# Patient Record
Sex: MALE | Race: WHITE | HISPANIC OR LATINO | Employment: FULL TIME | URBAN - METROPOLITAN AREA
[De-identification: names, ages, dates, MRNs, and addresses within clinical notes are randomized per-mention and may not be internally consistent; named-entity substitution may affect disease eponyms.]

---

## 2017-08-15 ENCOUNTER — HOSPITAL ENCOUNTER (EMERGENCY)
Facility: MEDICAL CENTER | Age: 42
End: 2017-08-15
Attending: EMERGENCY MEDICINE
Payer: COMMERCIAL

## 2017-08-15 VITALS
OXYGEN SATURATION: 95 % | DIASTOLIC BLOOD PRESSURE: 111 MMHG | TEMPERATURE: 98.2 F | HEART RATE: 83 BPM | HEIGHT: 67 IN | BODY MASS INDEX: 44.15 KG/M2 | SYSTOLIC BLOOD PRESSURE: 207 MMHG | WEIGHT: 281.31 LBS | RESPIRATION RATE: 20 BRPM

## 2017-08-15 DIAGNOSIS — I10 ESSENTIAL HYPERTENSION: ICD-10-CM

## 2017-08-15 LAB
ALBUMIN SERPL BCP-MCNC: 4 G/DL (ref 3.2–4.9)
ALBUMIN/GLOB SERPL: 1.2 G/DL
ALP SERPL-CCNC: 86 U/L (ref 30–99)
ALT SERPL-CCNC: 38 U/L (ref 2–50)
ANION GAP SERPL CALC-SCNC: 10 MMOL/L (ref 0–11.9)
AST SERPL-CCNC: 28 U/L (ref 12–45)
BASOPHILS # BLD AUTO: 0.5 % (ref 0–1.8)
BASOPHILS # BLD: 0.04 K/UL (ref 0–0.12)
BILIRUB SERPL-MCNC: 0.6 MG/DL (ref 0.1–1.5)
BUN SERPL-MCNC: 14 MG/DL (ref 8–22)
CALCIUM SERPL-MCNC: 9 MG/DL (ref 8.5–10.5)
CHLORIDE SERPL-SCNC: 105 MMOL/L (ref 96–112)
CO2 SERPL-SCNC: 25 MMOL/L (ref 20–33)
CREAT SERPL-MCNC: 0.89 MG/DL (ref 0.5–1.4)
EKG IMPRESSION: NORMAL
EOSINOPHIL # BLD AUTO: 0.11 K/UL (ref 0–0.51)
EOSINOPHIL NFR BLD: 1.4 % (ref 0–6.9)
ERYTHROCYTE [DISTWIDTH] IN BLOOD BY AUTOMATED COUNT: 42.2 FL (ref 35.9–50)
GFR SERPL CREATININE-BSD FRML MDRD: >60 ML/MIN/1.73 M 2
GLOBULIN SER CALC-MCNC: 3.3 G/DL (ref 1.9–3.5)
GLUCOSE SERPL-MCNC: 134 MG/DL (ref 65–99)
HCT VFR BLD AUTO: 43.3 % (ref 42–52)
HGB BLD-MCNC: 15.4 G/DL (ref 14–18)
IMM GRANULOCYTES # BLD AUTO: 0.02 K/UL (ref 0–0.11)
IMM GRANULOCYTES NFR BLD AUTO: 0.3 % (ref 0–0.9)
LYMPHOCYTES # BLD AUTO: 2.45 K/UL (ref 1–4.8)
LYMPHOCYTES NFR BLD: 31.4 % (ref 22–41)
MCH RBC QN AUTO: 29.8 PG (ref 27–33)
MCHC RBC AUTO-ENTMCNC: 35.6 G/DL (ref 33.7–35.3)
MCV RBC AUTO: 83.9 FL (ref 81.4–97.8)
MONOCYTES # BLD AUTO: 0.46 K/UL (ref 0–0.85)
MONOCYTES NFR BLD AUTO: 5.9 % (ref 0–13.4)
NEUTROPHILS # BLD AUTO: 4.73 K/UL (ref 1.82–7.42)
NEUTROPHILS NFR BLD: 60.5 % (ref 44–72)
NRBC # BLD AUTO: 0 K/UL
NRBC BLD AUTO-RTO: 0 /100 WBC
PLATELET # BLD AUTO: 263 K/UL (ref 164–446)
PMV BLD AUTO: 10.2 FL (ref 9–12.9)
POTASSIUM SERPL-SCNC: 3.6 MMOL/L (ref 3.6–5.5)
PROT SERPL-MCNC: 7.3 G/DL (ref 6–8.2)
RBC # BLD AUTO: 5.16 M/UL (ref 4.7–6.1)
SODIUM SERPL-SCNC: 140 MMOL/L (ref 135–145)
TROPONIN I SERPL-MCNC: 0.01 NG/ML (ref 0–0.04)
WBC # BLD AUTO: 7.8 K/UL (ref 4.8–10.8)

## 2017-08-15 PROCEDURE — 99284 EMERGENCY DEPT VISIT MOD MDM: CPT

## 2017-08-15 PROCEDURE — 93005 ELECTROCARDIOGRAM TRACING: CPT

## 2017-08-15 PROCEDURE — 85025 COMPLETE CBC W/AUTO DIFF WBC: CPT

## 2017-08-15 PROCEDURE — 84484 ASSAY OF TROPONIN QUANT: CPT

## 2017-08-15 PROCEDURE — 80053 COMPREHEN METABOLIC PANEL: CPT

## 2017-08-15 RX ORDER — LISINOPRIL 20 MG/1
20 TABLET ORAL DAILY
Qty: 30 TAB | Refills: 1 | Status: SHIPPED | OUTPATIENT
Start: 2017-08-15

## 2017-08-15 RX ORDER — LISINOPRIL 10 MG/1
10 TABLET ORAL DAILY
COMMUNITY
End: 2017-08-15

## 2017-08-15 RX ORDER — ATORVASTATIN CALCIUM 10 MG/1
10 TABLET, FILM COATED ORAL NIGHTLY
COMMUNITY

## 2017-08-15 ASSESSMENT — LIFESTYLE VARIABLES
AVERAGE NUMBER OF DAYS PER WEEK YOU HAVE A DRINK CONTAINING ALCOHOL: 1
HOW MANY TIMES IN THE PAST YEAR HAVE YOU HAD 5 OR MORE DRINKS IN A DAY: 0
EVER FELT BAD OR GUILTY ABOUT YOUR DRINKING: NO
TOTAL SCORE: 0
HAVE YOU EVER FELT YOU SHOULD CUT DOWN ON YOUR DRINKING: NO
TOTAL SCORE: 0
ON A TYPICAL DAY WHEN YOU DRINK ALCOHOL HOW MANY DRINKS DO YOU HAVE: 1
EVER HAD A DRINK FIRST THING IN THE MORNING TO STEADY YOUR NERVES TO GET RID OF A HANGOVER: NO
TOTAL SCORE: 0
CONSUMPTION TOTAL: NEGATIVE
DO YOU DRINK ALCOHOL: YES
HAVE PEOPLE ANNOYED YOU BY CRITICIZING YOUR DRINKING: NO

## 2017-08-15 ASSESSMENT — PAIN SCALES - GENERAL: PAINLEVEL_OUTOF10: 0

## 2017-08-15 NOTE — ED NOTES
Pt came in on recommendation from PCP due to HTN.  Pt presents in ER with blood pressure 190/112.  Pt states history of DM2.  States does not know blood glucose value and does not normally measure BG.

## 2017-08-15 NOTE — DISCHARGE INSTRUCTIONS
Arterial Hypertension  Arterial hypertension (high blood pressure) is a condition of elevated pressure in your blood vessels. Hypertension over a long period of time is a risk factor for strokes, heart attacks, and heart failure. It is also the leading cause of kidney (renal) failure.   CAUSES   · In Adults -- Over 90% of all hypertension has no known cause. This is called essential or primary hypertension. In the other 10% of people with hypertension, the increase in blood pressure is caused by another disorder. This is called secondary hypertension. Important causes of secondary hypertension are:  · Heavy alcohol use.  · Obstructive sleep apnea.  · Hyperaldosterosim (Conn's syndrome).  · Steroid use.  · Chronic kidney failure.  · Hyperparathyroidism.  · Medications.  · Renal artery stenosis.  · Pheochromocytoma.  · Cushing's disease.  · Coarctation of the aorta.  · Scleroderma renal crisis.  · Licorice (in excessive amounts).  · Drugs (cocaine, methamphetamine).  Your caregiver can explain any items above that apply to you.  · In Children -- Secondary hypertension is more common and should always be considered.  · Pregnancy -- Few women of childbearing age have high blood pressure. However, up to 10% of them develop hypertension of pregnancy. Generally, this will not harm the woman. It may be a sign of 3 complications of pregnancy: preeclampsia, HELLP syndrome, and eclampsia. Follow up and control with medication is necessary.  SYMPTOMS   · This condition normally does not produce any noticeable symptoms. It is usually found during a routine exam.  · Malignant hypertension is a late problem of high blood pressure. It may have the following symptoms:  · Headaches.  · Blurred vision.  · End-organ damage (this means your kidneys, heart, lungs, and other organs are being damaged).  · Stressful situations can increase the blood pressure. If a person with normal blood pressure has their blood pressure go up while being  "seen by their caregiver, this is often termed \"white coat hypertension.\" Its importance is not known. It may be related with eventually developing hypertension or complications of hypertension.  · Hypertension is often confused with mental tension, stress, and anxiety.  DIAGNOSIS   The diagnosis is made by 3 separate blood pressure measurements. They are taken at least 1 week apart from each other. If there is organ damage from hypertension, the diagnosis may be made without repeat measurements.  Hypertension is usually identified by having blood pressure readings:  · Above 140/90 mmHg measured in both arms, at 3 separate times, over a couple weeks.  · Over 130/80 mmHg should be considered a risk factor and may require treatment in patients with diabetes.  Blood pressure readings over 120/80 mmHg are called \"pre-hypertension\" even in non-diabetic patients.  To get a true blood pressure measurement, use the following guidelines. Be aware of the factors that can alter blood pressure readings.  · Take measurements at least 1 hour after caffeine.  · Take measurements 30 minutes after smoking and without any stress. This is another reason to quit smoking  it raises your blood pressure.  · Use a proper cuff size. Ask your caregiver if you are not sure about your cuff size.  · Most home blood pressure cuffs are automatic. They will measure systolic and diastolic pressures. The systolic pressure is the pressure reading at the start of sounds. Diastolic pressure is the pressure at which the sounds disappear. If you are elderly, measure pressures in multiple postures. Try sitting, lying or standing.  · Sit at rest for a minimum of 5 minutes before taking measurements.  · You should not be on any medications like decongestants. These are found in many cold medications.  · Record your blood pressure readings and review them with your caregiver.  If you have hypertension:  · Your caregiver may do tests to be sure you do not have " "secondary hypertension (see \"causes\" above).  · Your caregiver may also look for signs of metabolic syndrome. This is also called Syndrome X or Insulin Resistance Syndrome. You may have this syndrome if you have type 2 diabetes, abdominal obesity, and abnormal blood lipids in addition to hypertension.  · Your caregiver will take your medical and family history and perform a physical exam.  · Diagnostic tests may include blood tests (for glucose, cholesterol, potassium, and kidney function), a urinalysis, or an EKG. Other tests may also be necessary depending on your condition.  PREVENTION   There are important lifestyle issues that you can adopt to reduce your chance of developing hypertension:  · Maintain a normal weight.  · Limit the amount of salt (sodium) in your diet.  · Exercise often.  · Limit alcohol intake.  · Get enough potassium in your diet. Discuss specific advice with your caregiver.  · Follow a DASH diet (dietary approaches to stop hypertension). This diet is rich in fruits, vegetables, and low-fat dairy products, and avoids certain fats.  PROGNOSIS   Essential hypertension cannot be cured. Lifestyle changes and medical treatment can lower blood pressure and reduce complications. The prognosis of secondary hypertension depends on the underlying cause. Many people whose hypertension is controlled with medicine or lifestyle changes can live a normal, healthy life.   RISKS AND COMPLICATIONS   While high blood pressure alone is not an illness, it often requires treatment due to its short- and long-term effects on many organs. Hypertension increases your risk for:  · CVAs or strokes (cerebrovascular accident).  · Heart failure due to chronically high blood pressure (hypertensive cardiomyopathy).  · Heart attack (myocardial infarction).  · Damage to the retina (hypertensive retinopathy).  · Kidney failure (hypertensive nephropathy).  Your caregiver can explain list items above that apply to you. Treatment " "of hypertension can significantly reduce the risk of complications.  TREATMENT   · For overweight patients, weight loss and regular exercise are recommended. Physical fitness lowers blood pressure.  · Mild hypertension is usually treated with diet and exercise. A diet rich in fruits and vegetables, fat-free dairy products, and foods low in fat and salt (sodium) can help lower blood pressure. Decreasing salt intake decreases blood pressure in a 1/3 of people.  · Stop smoking if you are a smoker.  The steps above are highly effective in reducing blood pressure. While these actions are easy to suggest, they are difficult to achieve. Most patients with moderate or severe hypertension end up requiring medications to bring their blood pressure down to a normal level. There are several classes of medications for treatment. Blood pressure pills (antihypertensives) will lower blood pressure by their different actions. Lowering the blood pressure by 10 mmHg may decrease the risk of complications by as much as 25%.  The goal of treatment is effective blood pressure control. This will reduce your risk for complications. Your caregiver will help you determine the best treatment for you according to your lifestyle. What is excellent treatment for one person, may not be for you.  HOME CARE INSTRUCTIONS   · Do not smoke.  · Follow the lifestyle changes outlined in the \"Prevention\" section.  · If you are on medications, follow the directions carefully. Blood pressure medications must be taken as prescribed. Skipping doses reduces their benefit. It also puts you at risk for problems.  · Follow up with your caregiver, as directed.  · If you are asked to monitor your blood pressure at home, follow the guidelines in the \"Diagnosis\" section above.  SEEK MEDICAL CARE IF:   · You think you are having medication side effects.  · You have recurrent headaches or lightheadedness.  · You have swelling in your ankles.  · You have trouble with " your vision.  SEEK IMMEDIATE MEDICAL CARE IF:   · You have sudden onset of chest pain or pressure, difficulty breathing, or other symptoms of a heart attack.  · You have a severe headache.  · You have symptoms of a stroke (such as sudden weakness, difficulty speaking, difficulty walking).  MAKE SURE YOU:   · Understand these instructions.  · Will watch your condition.  · Will get help right away if you are not doing well or get worse.  Document Released: 12/18/2006 Document Revised: 03/11/2013 Document Reviewed: 07/17/2008  [employer]® Patient Information ©2014 [employer].

## 2017-08-15 NOTE — ED AVS SNAPSHOT
8/15/2017    Deshawn Larson  [address] [place] [address]    Dear Deshawn:    [facility] wants to ensure your discharge home is safe and you or your loved ones have had all of your questions answered regarding your care after you leave the hospital.    Below is a list of resources and contact information should you have any questions regarding your hospital stay, follow-up instructions, or active medical symptoms.    Questions or Concerns Regarding… Contact   Medical Questions Related to Your Discharge  (7 days a week, 8am-5pm) Contact a Nurse Care Coordinator   [phone]   Medical Questions Not Related to Your Discharge  (24 hours a day / 7 days a week)  Contact the Nurse Health Line   [phone]    Medications or Discharge Instructions Refer to your discharge packet   or contact your [facility] Primary Care Provider   [phone]   Follow-up Appointment(s) Schedule your appointment via [employer]   or contact Scheduling [phone]   Billing Review your statement via [employer]  or contact Billing [phone]   Medical Records Review your records via [employer]   or contact Medical Records [phone]     You may receive a telephone call within two days of discharge. This call is to make certain you understand your discharge instructions and have the opportunity to have any questions answered. You can also easily access your medical information, test results and upcoming appointments via the [employer] free online health management tool. You can learn more and sign up at [employer]. For assistance setting up your [employer] account, please call [phone].    Once again, we want to ensure your discharge home is safe and that you have a clear understanding of any next steps in your care. If you have any questions or concerns, please do not hesitate to contact us, we are here for you. Thank you for choosing [facility] for your healthcare needs.    Sincerely,    Your [facility] Healthcare Team

## 2017-08-15 NOTE — ED NOTES
Pt ambulated to triage with   Chief Complaint   Patient presents with   • Blood Pressure Problem     pt reports taking meds as prescribed, pt denies taking BP at home, pt denies HA or blurred vision.     Pt reports going to his PCP today to get established d/t just moving here from [place].  Pt denies chest pain or any other complaints.  Pt had EKG completed at PCP and they told him it was abnormal.  ED tech doing EKG here.  Pt Informed regarding triage process and verbalized understanding to inform triage tech or RN for any changes in condition. Placed in lobby.

## 2017-08-15 NOTE — ED PROVIDER NOTES
"ED Provider Note    CHIEF COMPLAINT  Chief Complaint   Patient presents with   • Blood Pressure Problem     pt reports taking meds as prescribed, pt denies taking BP at home, pt denies HA or blurred vision.       HPI  Deshawn Larson is a 42 y.o. male who presents to the emergency department with hypertension. The patient was establishing primary care today when they noted his blood pressure to be high. The patient subsequently had an EKG and it felt was abnormal and is transferred to the emergency department for a higher level of care. The patient states that he has not had any chest pain or changes in breathing patterns. He states that he's been asymptomatic and went in today for routine health maintenance for his diabetes. He does take metformin as well as lisinopril. He has not had any pain or swelling to his lower extremities.    REVIEW OF SYSTEMS  See HPI for further details. All other systems are negative.     PAST MEDICAL HISTORY  Past Medical History   Diagnosis Date   • Diabetes ([facility])    • Hypertension    • Dyslipidemia        SOCIAL HISTORY  Social History     Social History   • Marital Status: Single     Spouse Name: N/A   • Number of Children: N/A   • Years of Education: N/A     Social History Main Topics   • Smoking status: Never Smoker    • Smokeless tobacco: None   • Alcohol Use: Yes      Comment: salvador   • Drug Use: No   • Sexual Activity: Not Asked     Other Topics Concern   • None     Social History Narrative   • None           PHYSICAL EXAM  VITAL SIGNS: /111 mmHg  Pulse 82  Temp(Src) 36.8 °C (98.2 °F) (Temporal)  Resp 18  Ht 1.702 m (5' 7\")  Wt 127.6 kg (281 lb 4.9 oz)  BMI 44.05 kg/m2  SpO2 95%  Constitutional: Well developed, Well nourished, No acute distress, Non-toxic appearance.   HENT: Normocephalic, Atraumatic, tympanic membranes are intact and nonerythematous bilaterally, Oropharynx moist without exudates or erythema, Nose normal.   Eyes: PERRLA, EOMI, Conjunctiva " normal.  Neck: Supple without meningismus  Lymphatic: No lymphadenopathy noted.   Cardiovascular: Normal heart rate, Normal rhythm, No murmurs, No rubs, No gallops.   Thorax & Lungs: Normal breath sounds, No respiratory distress, No wheezing, No chest tenderness.   Abdomen: Bowel sounds normal, Soft, No tenderness, no rebound, no guarding, no distention, No masses, No pulsatile masses.   Skin: Warm, Dry, No erythema, No rash.   Back: No tenderness, No CVA tenderness.   Extremities: Atraumatic with symmetric distal pulses, No edema, No tenderness, No cyanosis, No clubbing.   Neurologic: Alert & oriented x 3, cranial nerves II through XII are intact, Normal motor function, Normal sensory function, No focal deficits noted.   Psychiatric: Affect normal, Judgment normal, Mood normal.     EKG  Twelve-lead EKG interpreted by myself shows a normal sinus rhythm with ventricular rate of 79. Right axis deviation, no ST segment elevation or depression, T waves inverted laterally      COURSE & MEDICAL DECISION MAKING  Pertinent Labs & Imaging studies reviewed. (See chart for details)  This a 42-year-old gentleman who presents to emergency department with hypertension that was noted in the clinic. The patient has not had any associated chest pain. I do not suspect is having acute cardiac event based on his age and lack of risk factors. Furthermore he is asymptomatic. His blood pressure was significantly elevated and we'll increase the patient's lisinopril to 20 milligrams. His renal function appears appropriate. His blood sugar slightly elevated at 134 and he'll follow this in the clinic. The patient will start taking the lisinopril 20 milligrams a day and he'll follow up in 2 weeks for repeat blood pressure check as well as Accu-Chek at the primary care provider's office. He'll return if he becomes symptomatic in any way.    FINAL IMPRESSION  1. Hypertension  2. Diabetes mellitus       Disposition  The patient will be discharged  in stable condition    Electronically signed by: Ravi aDvis, 8/15/2017 1:34 PM

## 2017-08-15 NOTE — ED AVS SNAPSHOT
Home Care Instructions                                                                                                                Deshawn Larson   MRN: [id number]    Department:  [facility], Emergency Dept   Date of Visit:  8/15/2017            [facility], Emergency Dept    [address] [place] [id number]    Phone:  [phone]      You were seen by     Ravi Davis M.D.      Your Diagnosis Was     Essential hypertension     I10       Follow-up Information     1. Follow up with [facility], Emergency Dept.    Specialty:  Emergency Medicine    Why:  As needed    Contact information    [address] [place] [id number] [phone]      Medication Information     Review all of your home medications and newly ordered medications with your primary doctor and/or pharmacist as soon as possible. Follow medication instructions as directed by your doctor and/or pharmacist.     Please keep your complete medication list with you and share with your physician. Update the information when medications are discontinued, doses are changed, or new medications (including over-the-counter products) are added; and carry medication information at all times in the event of emergency situations.               Medication List      START taking these medications        Instructions    Morning Afternoon Evening Bedtime    lisinopril 20 MG Tabs   Commonly known as:  PRINIVIL        Take 1 Tab by mouth every day.   Dose:  20 mg                          ASK your doctor about these medications        Instructions    Morning Afternoon Evening Bedtime    atorvastatin 10 MG Tabs   Commonly known as:  LIPITOR        Take 10 mg by mouth every evening.   Dose:  10 mg                        metformin 500 MG Tabs   Commonly known as:  GLUCOPHAGE        Take 1,000 mg by mouth 2 times a day, with meals.   Dose:  1000 mg                             Where to Get Your  Medications      You can get these medications from any pharmacy     Bring a paper prescription for each of these medications    - lisinopril 20 MG Tabs            Procedures and tests performed during your visit     CBC WITH DIFFERENTIAL    COMP METABOLIC PANEL    EKG (NOW)    ESTIMATED GFR    IV Saline Lock    TROPONIN        Discharge Instructions       Arterial Hypertension  Arterial hypertension (high blood pressure) is a condition of elevated pressure in your blood vessels. Hypertension over a long period of time is a risk factor for strokes, heart attacks, and heart failure. It is also the leading cause of kidney (renal) failure.   CAUSES   · In Adults -- Over 90% of all hypertension has no known cause. This is called essential or primary hypertension. In the other 10% of people with hypertension, the increase in blood pressure is caused by another disorder. This is called secondary hypertension. Important causes of secondary hypertension are:  · Heavy alcohol use.  · Obstructive sleep apnea.  · Hyperaldosterosim (Conn's syndrome).  · Steroid use.  · Chronic kidney failure.  · Hyperparathyroidism.  · Medications.  · Renal artery stenosis.  · Pheochromocytoma.  · Cushing's disease.  · Coarctation of the aorta.  · Scleroderma renal crisis.  · Licorice (in excessive amounts).  · Drugs (cocaine, methamphetamine).  Your caregiver can explain any items above that apply to you.  · In Children -- Secondary hypertension is more common and should always be considered.  · Pregnancy -- Few women of childbearing age have high blood pressure. However, up to 10% of them develop hypertension of pregnancy. Generally, this will not harm the woman. It may be a sign of 3 complications of pregnancy: preeclampsia, HELLP syndrome, and eclampsia. Follow up and control with medication is necessary.  SYMPTOMS   · This condition normally does not produce any noticeable symptoms. It is usually found during a routine exam.  · Malignant  "hypertension is a late problem of high blood pressure. It may have the following symptoms:  · Headaches.  · Blurred vision.  · End-organ damage (this means your kidneys, heart, lungs, and other organs are being damaged).  · Stressful situations can increase the blood pressure. If a person with normal blood pressure has their blood pressure go up while being seen by their caregiver, this is often termed \"white coat hypertension.\" Its importance is not known. It may be related with eventually developing hypertension or complications of hypertension.  · Hypertension is often confused with mental tension, stress, and anxiety.  DIAGNOSIS   The diagnosis is made by 3 separate blood pressure measurements. They are taken at least 1 week apart from each other. If there is organ damage from hypertension, the diagnosis may be made without repeat measurements.  Hypertension is usually identified by having blood pressure readings:  · Above 140/90 mmHg measured in both arms, at 3 separate times, over a couple weeks.  · Over 130/80 mmHg should be considered a risk factor and may require treatment in patients with diabetes.  Blood pressure readings over 120/80 mmHg are called \"pre-hypertension\" even in non-diabetic patients.  To get a true blood pressure measurement, use the following guidelines. Be aware of the factors that can alter blood pressure readings.  · Take measurements at least 1 hour after caffeine.  · Take measurements 30 minutes after smoking and without any stress. This is another reason to quit smoking  it raises your blood pressure.  · Use a proper cuff size. Ask your caregiver if you are not sure about your cuff size.  · Most home blood pressure cuffs are automatic. They will measure systolic and diastolic pressures. The systolic pressure is the pressure reading at the start of sounds. Diastolic pressure is the pressure at which the sounds disappear. If you are elderly, measure pressures in multiple postures. Try " "sitting, lying or standing.  · Sit at rest for a minimum of 5 minutes before taking measurements.  · You should not be on any medications like decongestants. These are found in many cold medications.  · Record your blood pressure readings and review them with your caregiver.  If you have hypertension:  · Your caregiver may do tests to be sure you do not have secondary hypertension (see \"causes\" above).  · Your caregiver may also look for signs of metabolic syndrome. This is also called Syndrome X or Insulin Resistance Syndrome. You may have this syndrome if you have type 2 diabetes, abdominal obesity, and abnormal blood lipids in addition to hypertension.  · Your caregiver will take your medical and family history and perform a physical exam.  · Diagnostic tests may include blood tests (for glucose, cholesterol, potassium, and kidney function), a urinalysis, or an EKG. Other tests may also be necessary depending on your condition.  PREVENTION   There are important lifestyle issues that you can adopt to reduce your chance of developing hypertension:  · Maintain a normal weight.  · Limit the amount of salt (sodium) in your diet.  · Exercise often.  · Limit alcohol intake.  · Get enough potassium in your diet. Discuss specific advice with your caregiver.  · Follow a DASH diet (dietary approaches to stop hypertension). This diet is rich in fruits, vegetables, and low-fat dairy products, and avoids certain fats.  PROGNOSIS   Essential hypertension cannot be cured. Lifestyle changes and medical treatment can lower blood pressure and reduce complications. The prognosis of secondary hypertension depends on the underlying cause. Many people whose hypertension is controlled with medicine or lifestyle changes can live a normal, healthy life.   RISKS AND COMPLICATIONS   While high blood pressure alone is not an illness, it often requires treatment due to its short- and long-term effects on many organs. Hypertension increases " "your risk for:  · CVAs or strokes (cerebrovascular accident).  · Heart failure due to chronically high blood pressure (hypertensive cardiomyopathy).  · Heart attack (myocardial infarction).  · Damage to the retina (hypertensive retinopathy).  · Kidney failure (hypertensive nephropathy).  Your caregiver can explain list items above that apply to you. Treatment of hypertension can significantly reduce the risk of complications.  TREATMENT   · For overweight patients, weight loss and regular exercise are recommended. Physical fitness lowers blood pressure.  · Mild hypertension is usually treated with diet and exercise. A diet rich in fruits and vegetables, fat-free dairy products, and foods low in fat and salt (sodium) can help lower blood pressure. Decreasing salt intake decreases blood pressure in a 1/3 of people.  · Stop smoking if you are a smoker.  The steps above are highly effective in reducing blood pressure. While these actions are easy to suggest, they are difficult to achieve. Most patients with moderate or severe hypertension end up requiring medications to bring their blood pressure down to a normal level. There are several classes of medications for treatment. Blood pressure pills (antihypertensives) will lower blood pressure by their different actions. Lowering the blood pressure by 10 mmHg may decrease the risk of complications by as much as 25%.  The goal of treatment is effective blood pressure control. This will reduce your risk for complications. Your caregiver will help you determine the best treatment for you according to your lifestyle. What is excellent treatment for one person, may not be for you.  HOME CARE INSTRUCTIONS   · Do not smoke.  · Follow the lifestyle changes outlined in the \"Prevention\" section.  · If you are on medications, follow the directions carefully. Blood pressure medications must be taken as prescribed. Skipping doses reduces their benefit. It also puts you at risk for " "problems.  · Follow up with your caregiver, as directed.  · If you are asked to monitor your blood pressure at home, follow the guidelines in the \"Diagnosis\" section above.  SEEK MEDICAL CARE IF:   · You think you are having medication side effects.  · You have recurrent headaches or lightheadedness.  · You have swelling in your ankles.  · You have trouble with your vision.  SEEK IMMEDIATE MEDICAL CARE IF:   · You have sudden onset of chest pain or pressure, difficulty breathing, or other symptoms of a heart attack.  · You have a severe headache.  · You have symptoms of a stroke (such as sudden weakness, difficulty speaking, difficulty walking).  MAKE SURE YOU:   · Understand these instructions.  · Will watch your condition.  · Will get help right away if you are not doing well or get worse.  Document Released: 12/18/2006 Document Revised: 03/11/2013 Document Reviewed: 07/17/2008  ExitCare® Patient Information ©2014 [employer].            Patient Information     Patient Information    Following emergency treatment: all patient requiring follow-up care must return either to a private physician or a clinic if your condition worsens before you are able to obtain further medical attention, please return to the emergency room.     Billing Information    At [facility], we work to make the billing process streamlined for our patients.  Our Representatives are here to answer any questions you may have regarding your hospital bill.  If you have insurance coverage and have supplied your insurance information to us, we will submit a claim to your insurer on your behalf.  Should you have any questions regarding your bill, we can be reached online or by phone as follows:  Online: You are able pay your bills online or live chat with our representatives about any billing questions you may have. We are here to help Monday - Friday from 8:00am to 7:30pm and 9:00am - 12:00pm on Saturdays.  Please visit " [email or web address]  for more information.   Phone:  [phone] or [phone]    Please note that your emergency physician, surgeon, pathologist, radiologist, anesthesiologist, and other specialists are not employed by [facility] and will therefore bill separately for their services.  Please contact them directly for any questions concerning their bills at the numbers below:     Emergency Physician Services:  [phone]  [facility]:  [phone]  [facility]:  [phone]  [facility]:  [phone]    1. Your final bill may vary from the amount quoted upon discharge if all procedures are not complete at that time, or if your doctor has additional procedures of which we are not aware. You will receive an additional bill if you return to the Emergency Department at [facility] for suture removal regardless of the facility of which the sutures were placed.     2. Please arrange for settlement of this account at the emergency registration.    3. All self-pay accounts are due in full at the time of treatment.  If you are unable to meet this obligation then payment is expected within 4-5 days.     4. If you have had radiology studies (CT, X-ray, Ultrasound, MRI), you have received a preliminary result during your emergency department visit. Please contact the radiology department [phone] to receive a copy of your final result. Please discuss the Final result with your primary physician or with the follow up physician provided.     Crisis Hotline:  [place] Crisis Hotline:  7-904-MKMIXRG or 1-895.223.5883  [place] Crisis Hotline:    1-992.548.3682 or 306-951-6758         ED Discharge Follow Up Questions    1. In order to provide you with very good care, we would like to follow up with a phone call in the next few days.  May we have your permission to contact you?     YES /  NO    2. What is the best phone number to call  you? (       )_____-__________    3. What is the best time to call you?      Morning  /  Afternoon  /  Evening                   Patient Signature:  ____________________________________________________________    Date:  ____________________________________________________________

## 2017-08-15 NOTE — ED AVS SNAPSHOT
Novalys Access Code: [email or web address]  Expires: 9/14/2017  2:40 PM    Novalys  A secure, online tool to manage your health information     [employer] Novalys® is a secure, online tool that connects you to your personalized health information from the privacy of your home -- day or night - making it very easy for you to manage your healthcare. Once the activation process is completed, you can even access your medical information using the Novalys khushi, which is available for free in the Apple Khushi store or Google Play store.     Novalys provides the following levels of access (as shown below):   My Chart Features   [facility] Primary Care Doctor [facility]  Specialists [facility]  Urgent  Care Non-[facility]  Primary Care  Doctor   Email your healthcare team securely and privately 24/7 X X X X   Manage appointments: schedule your next appointment; view details of past/upcoming appointments X      Request prescription refills. X      View recent personal medical records, including lab and immunizations X X X X   View health record, including health history, allergies, medications X X X X   Read reports about your outpatient visits, procedures, consult and ER notes X X X X   See your discharge summary, which is a recap of your hospital and/or ER visit that includes your diagnosis, lab results, and care plan. X X       How to register for Novalys:  1. Go to  [email or web address].  2. Click on the Sign Up Now box, which takes you to the New Member Sign Up page. You will need to provide the following information:  a. Enter your Novalys Access Code exactly as it appears at the top of this page. (You will not need to use this code after you’ve completed the sign-up process. If you do not sign up before the expiration date, you must request a new code.)   b. Enter your date of birth.   c. Enter your home email address.   d. Click Submit, and follow the next screen’s instructions.  3. Create a Novalys ID. This will be your Novalys  login ID and cannot be changed, so think of one that is secure and easy to remember.  4. Create a Pict[employer]ae password. You can change your password at any time.  5. Enter your Password Reset Question and Answer. This can be used at a later time if you forget your password.   6. Enter your e-mail address. This allows you to receive e-mail notifications when new information is available in Picturae.  7. Click Sign Up. You can now view your health information.    For assistance activating your Picturae account, call [phone]

## 2017-08-15 NOTE — ED NOTES
Pt ambulatory  Vital signs stable  Pt handed d/c paperwork with understanding stated  Pt states will follow up with PCP  Pt handed prescriptions and states safe way home

## 2018-01-03 ENCOUNTER — SLEEP CENTER VISIT (OUTPATIENT)
Dept: SLEEP MEDICINE | Facility: MEDICAL CENTER | Age: 43
End: 2018-01-03
Payer: COMMERCIAL

## 2018-01-03 VITALS
HEART RATE: 85 BPM | BODY MASS INDEX: 44.42 KG/M2 | HEIGHT: 67 IN | SYSTOLIC BLOOD PRESSURE: 186 MMHG | WEIGHT: 283 LBS | TEMPERATURE: 99.1 F | OXYGEN SATURATION: 95 % | DIASTOLIC BLOOD PRESSURE: 100 MMHG | RESPIRATION RATE: 16 BRPM

## 2018-01-03 DIAGNOSIS — R06.83 SNORING: ICD-10-CM

## 2018-01-03 DIAGNOSIS — G47.10 HYPERSOMNOLENCE: ICD-10-CM

## 2018-01-03 DIAGNOSIS — G47.33 OBSTRUCTIVE SLEEP APNEA SYNDROME: ICD-10-CM

## 2018-01-03 PROCEDURE — 99204 OFFICE O/P NEW MOD 45 MIN: CPT | Performed by: INTERNAL MEDICINE

## 2018-01-03 RX ORDER — AMLODIPINE BESYLATE 10 MG/1
TABLET ORAL DAILY
COMMUNITY
Start: 2018-01-02

## 2018-01-03 RX ORDER — LISINOPRIL 40 MG/1
TABLET ORAL DAILY
COMMUNITY
Start: 2017-11-25

## 2018-01-03 RX ORDER — ATORVASTATIN CALCIUM 40 MG/1
TABLET, FILM COATED ORAL DAILY
COMMUNITY
Start: 2018-01-02

## 2018-01-04 NOTE — PROGRESS NOTES
CC:  Establish care for known sleep apnea hypopnea syndrome.    HPI:   Mr. Larson is a 42-year-old man referred by ORALIA Prater, to assist in evaluation and management of known sleep-disordered breathing.    About 15 years ago [place] with [place] and excessive daytime somnolence. He underwent polysomnography and was started on CPAP at 10 cm water pressure. He acclimated well to treatment using nasal pillows and was restudied in [place] about 5 years ago, , resulting in an increase in the CPAP pressure to 14 cm water. He continues to use the machine each night, throughout the night but notes and the machine is becoming increasingly noisy and beginning to malfunction.     He has a regular sleep schedule with sufficient time in bed. He goes to bed at about 10 or 11 PM and falls asleep quickly. He does not typically awaken at night and arises between 7 and 9 AM without fatigue, grogginess or morning headaches. He enjoys reasonable levels of daytime alertness and is not napping or falling asleep inappropriately. His [place] sleepiness score is 5 points. He does note that he becomes tired on those rare days after nights when he does not use the CPAP machine. He drinks mostly decaffeinated beverages and does not use substances to induce sleep or to maintain wakefulness. He does not have symptoms suggesting narcolepsy, parasomnia or restless leg syndrome. His CPAP machine does not have a data recorder.    His medical history is notable for systemic arterial hypertension treated with lisinopril, diabetes mellitus treated with metformin and dyslipidemia treated with atorvastatin.      Past Medical History:   Diagnosis Date   • Chickenpox    • Diabetes ([facility])    • Dyslipidemia    • Hypertension    • Sleep apnea        History reviewed. No pertinent surgical history.    Family History   Problem Relation Age of Onset   • Sleep Apnea Neg Hx        Social History     Social  "History   • Marital status: Single     Spouse name: N/A   • Number of children: N/A   • Years of education: N/A     Occupational History   • Not on file.     Social History Main Topics   • Smoking status: Never Smoker   • Smokeless tobacco: Never Used   • Alcohol use Yes      Comment: rarely   • Drug use: No   • Sexual activity: Not on file     Other Topics Concern   • Not on file     Social History Narrative   • No narrative on file       Current Outpatient Prescriptions   Medication Sig Dispense Refill   • amlodipine (NORVASC) 10 MG Tab every day.     • atorvastatin (LIPITOR) 40 MG Tab every day.     • lisinopril (PRINIVIL, ZESTRIL) 40 MG tablet every day.     • metformin (GLUCOPHAGE) 500 MG Tab Take 1,000 mg by mouth every day.     • atorvastatin (LIPITOR) 10 MG Tab Take 10 mg by mouth every evening.     • lisinopril (PRINIVIL) 20 MG Tab Take 1 Tab by mouth every day. 30 Tab 1     No current facility-administered medications for this visit.     \"CURRENT RX\"      Allergies: Patient has no known allergies.      ROS  Positive for the sleep, cardiac, endocrine and metabolic issues reviewed above as well as the items in the problem list and past medical history. He denies tinnitus or rhinitis, chest pain or palpitations, unusual cough or dyspnea, heartburn or abdominal discomfort. All other aspects of the CPT review of systems process are negative as outlined in the attached self history form.    Physical Exam:   BP (!) 186/100   Pulse 85   Temp 37.3 °C (99.1 °F)   Resp 16   Ht 1.702 m (5' 7\")   Wt (!) 128.4 kg (283 lb)   SpO2 95%   BMI 44.32 kg/m² . Neck circumference is 18 inches.  Head and neck examination demonstrates no mucosal lesion, purulent drainage or evident polyps. The pharynx is benign with a Mallampati I presentation. The neck is supple without thyromegaly. On chest examination there are symmetrical bilateral breath sounds without rales, wheezing or consolidation. On cardiac examination, the " apical impulse and heart sounds are normal and the rhythm is regular. There is no murmur, gallop or rub and no jugular venous distention. The abdomen is soft with active bowel sounds and no palpable hepatosplenomegaly, mass, guarding or rebound. The extremities show no clubbing, cyanosis or edema and no signs of deep venous thrombosis. There is no warmth, redness, tenderness or palpable venous cord in the calves. The skin is clear, warm and dry. There is no unusual peripheral lymphadenopathy. Peripheral pulses are palpable in all 4 extremities. On neurologic examination, cranial nerve function is intact, motor tone is symmetrical, and the patient is alert, oriented and responsive.       Problems:  1. Obstructive sleep apnea syndrome  He has significant sleep apnea hypopnea syndrome, apparently confirmed by polysomnography on 2 occasions over the last decade. He has done well on CPAP but did require an increase in the airway pressure point to 14 cm water about 5 years ago. Since that time he has lost about 10 pounds. His current CPAP machine is wearing out and he also needs a new mask and other peripherals. Continued effective treatment is required not only to maintain levels of daytime alertness but also reduce a cardiac and neurologic risks associated with untreated sleep apnea hypopnea.    2. Hypersomnolence  Resolved with the use of nocturnal CPAP.    3. Snoring  Also resolved with the use of nocturnal CPAP.    4. Systemic arterial hypertension  His blood pressure was initially elevated when he arrived at the office today but did improve on subsequent testing. He is on lisinopril at 40 mg a day      Plan:   1. Mask fitting today. He did well with large dream where nasal pillows.    2. New auto-titrating CPAP machine set for a pressure of 8-16 cm water with heated humidification.    3. We will try to obtain else of his most recent sleep study from [place]. If we cannot secure those results, he may need a home  sleep test to qualify for continued treatment.    4. Continue treatment with lisinopril, monitoring his blood pressure and following up with primary care provider.    5. Return visit here in about 2 months, bringing the CPAP data recording chip with him. He may drop and the technician clinic at any time for assistance.    We appreciate the opportunity to assist in his care.  Return in about 2 months (around 3/3/2018).